# Patient Record
Sex: FEMALE
[De-identification: names, ages, dates, MRNs, and addresses within clinical notes are randomized per-mention and may not be internally consistent; named-entity substitution may affect disease eponyms.]

---

## 2020-01-14 ENCOUNTER — HOSPITAL ENCOUNTER (INPATIENT)
Dept: HOSPITAL 92 - ERS | Age: 65
LOS: 6 days | Discharge: NURSING, REHAB OR LONG-TERM CARE | DRG: 64 | End: 2020-01-20
Attending: FAMILY MEDICINE | Admitting: FAMILY MEDICINE
Payer: MEDICARE

## 2020-01-14 VITALS — BODY MASS INDEX: 46.8 KG/M2

## 2020-01-14 DIAGNOSIS — Z90.49: ICD-10-CM

## 2020-01-14 DIAGNOSIS — R40.2252: ICD-10-CM

## 2020-01-14 DIAGNOSIS — G93.6: ICD-10-CM

## 2020-01-14 DIAGNOSIS — E66.01: ICD-10-CM

## 2020-01-14 DIAGNOSIS — N17.9: ICD-10-CM

## 2020-01-14 DIAGNOSIS — R47.81: ICD-10-CM

## 2020-01-14 DIAGNOSIS — Z79.899: ICD-10-CM

## 2020-01-14 DIAGNOSIS — I48.91: ICD-10-CM

## 2020-01-14 DIAGNOSIS — I63.511: Primary | ICD-10-CM

## 2020-01-14 DIAGNOSIS — G81.94: ICD-10-CM

## 2020-01-14 DIAGNOSIS — R40.2142: ICD-10-CM

## 2020-01-14 DIAGNOSIS — I10: ICD-10-CM

## 2020-01-14 DIAGNOSIS — Z23: ICD-10-CM

## 2020-01-14 DIAGNOSIS — I34.1: ICD-10-CM

## 2020-01-14 DIAGNOSIS — Z66: ICD-10-CM

## 2020-01-14 DIAGNOSIS — R29.724: ICD-10-CM

## 2020-01-14 DIAGNOSIS — E78.5: ICD-10-CM

## 2020-01-14 DIAGNOSIS — R29.810: ICD-10-CM

## 2020-01-14 DIAGNOSIS — N30.00: ICD-10-CM

## 2020-01-14 DIAGNOSIS — R40.2362: ICD-10-CM

## 2020-01-14 DIAGNOSIS — B96.4: ICD-10-CM

## 2020-01-14 LAB
ALBUMIN SERPL BCG-MCNC: 4.5 G/DL (ref 3.4–4.8)
ALP SERPL-CCNC: 84 U/L (ref 40–110)
ALT SERPL W P-5'-P-CCNC: 28 U/L (ref 8–55)
ANION GAP SERPL CALC-SCNC: 16 MMOL/L (ref 10–20)
APTT PPP: 30.9 SEC (ref 22.9–36.1)
AST SERPL-CCNC: 24 U/L (ref 5–34)
BACTERIA UR QL AUTO: (no result) HPF
BASOPHILS # BLD AUTO: 0 THOU/UL (ref 0–0.2)
BASOPHILS NFR BLD AUTO: 0.1 % (ref 0–1)
BILIRUB SERPL-MCNC: 0.4 MG/DL (ref 0.2–1.2)
BUN SERPL-MCNC: 21 MG/DL (ref 9.8–20.1)
CALCIUM SERPL-MCNC: 9.6 MG/DL (ref 7.8–10.44)
CHLORIDE SERPL-SCNC: 103 MMOL/L (ref 98–107)
CO2 SERPL-SCNC: 24 MMOL/L (ref 23–31)
CREAT CL PREDICTED SERPL C-G-VRATE: 0 ML/MIN (ref 70–130)
DIGOXIN SERPL-MCNC: 0.7 NG/ML (ref 0.8–2)
EOSINOPHIL # BLD AUTO: 0 THOU/UL (ref 0–0.7)
EOSINOPHIL NFR BLD AUTO: 0.1 % (ref 0–10)
GLOBULIN SER CALC-MCNC: 2.9 G/DL (ref 2.4–3.5)
GLUCOSE SERPL-MCNC: 144 MG/DL (ref 80–115)
HGB BLD-MCNC: 14.1 G/DL (ref 12–16)
INR PPP: 1
LEUKOCYTE ESTERASE UR QL STRIP.AUTO: 500 LEU/UL
LYMPHOCYTES # BLD: 1.3 THOU/UL (ref 1.2–3.4)
LYMPHOCYTES NFR BLD AUTO: 11.8 % (ref 21–51)
MCH RBC QN AUTO: 29.3 PG (ref 27–31)
MCV RBC AUTO: 89.8 FL (ref 78–98)
MONOCYTES # BLD AUTO: 0.6 THOU/UL (ref 0.11–0.59)
MONOCYTES NFR BLD AUTO: 5.7 % (ref 0–10)
NEUTROPHILS # BLD AUTO: 8.8 THOU/UL (ref 1.4–6.5)
NEUTROPHILS NFR BLD AUTO: 82.3 % (ref 42–75)
PLATELET # BLD AUTO: 231 THOU/UL (ref 130–400)
POTASSIUM SERPL-SCNC: 3.8 MMOL/L (ref 3.5–5.1)
PROT UR STRIP.AUTO-MCNC: 50 MG/DL
PROTHROMBIN TIME: 13.4 SEC (ref 12–14.7)
RBC # BLD AUTO: 4.79 MILL/UL (ref 4.2–5.4)
RBC UR QL AUTO: (no result) HPF (ref 0–3)
SODIUM SERPL-SCNC: 139 MMOL/L (ref 136–145)
WBC # BLD AUTO: 10.7 THOU/UL (ref 4.8–10.8)
WBC UR QL AUTO: (no result) HPF (ref 0–3)

## 2020-01-14 PROCEDURE — 80162 ASSAY OF DIGOXIN TOTAL: CPT

## 2020-01-14 PROCEDURE — 90471 IMMUNIZATION ADMIN: CPT

## 2020-01-14 PROCEDURE — 74230 X-RAY XM SWLNG FUNCJ C+: CPT

## 2020-01-14 PROCEDURE — 81003 URINALYSIS AUTO W/O SCOPE: CPT

## 2020-01-14 PROCEDURE — 85014 HEMATOCRIT: CPT

## 2020-01-14 PROCEDURE — 85018 HEMOGLOBIN: CPT

## 2020-01-14 PROCEDURE — 93005 ELECTROCARDIOGRAM TRACING: CPT

## 2020-01-14 PROCEDURE — 36416 COLLJ CAPILLARY BLOOD SPEC: CPT

## 2020-01-14 PROCEDURE — 85049 AUTOMATED PLATELET COUNT: CPT

## 2020-01-14 PROCEDURE — 87077 CULTURE AEROBIC IDENTIFY: CPT

## 2020-01-14 PROCEDURE — 85025 COMPLETE CBC W/AUTO DIFF WBC: CPT

## 2020-01-14 PROCEDURE — 84484 ASSAY OF TROPONIN QUANT: CPT

## 2020-01-14 PROCEDURE — 80053 COMPREHEN METABOLIC PANEL: CPT

## 2020-01-14 PROCEDURE — 81015 MICROSCOPIC EXAM OF URINE: CPT

## 2020-01-14 PROCEDURE — 85610 PROTHROMBIN TIME: CPT

## 2020-01-14 PROCEDURE — 93306 TTE W/DOPPLER COMPLETE: CPT

## 2020-01-14 PROCEDURE — 80048 BASIC METABOLIC PNL TOTAL CA: CPT

## 2020-01-14 PROCEDURE — 80061 LIPID PANEL: CPT

## 2020-01-14 PROCEDURE — C9113 INJ PANTOPRAZOLE SODIUM, VIA: HCPCS

## 2020-01-14 PROCEDURE — 85730 THROMBOPLASTIN TIME PARTIAL: CPT

## 2020-01-14 PROCEDURE — 90670 PCV13 VACCINE IM: CPT

## 2020-01-14 PROCEDURE — 36415 COLL VENOUS BLD VENIPUNCTURE: CPT

## 2020-01-14 PROCEDURE — G0009 ADMIN PNEUMOCOCCAL VACCINE: HCPCS

## 2020-01-14 PROCEDURE — 87086 URINE CULTURE/COLONY COUNT: CPT

## 2020-01-14 PROCEDURE — 70450 CT HEAD/BRAIN W/O DYE: CPT

## 2020-01-14 PROCEDURE — 82565 ASSAY OF CREATININE: CPT

## 2020-01-14 PROCEDURE — 87186 SC STD MICRODIL/AGAR DIL: CPT

## 2020-01-14 NOTE — CT
Head CT without contrast

1/14/2020:



Comparison: None



HISTORY: Left facial droop



TECHNIQUE: Axial CT imaging at 5 mm intervals from vertex through skull base without contrast



FINDINGS: There is a large area of hypodensity with loss of gray-white differentiation involving the 
majority of the MCA territory on the right, evidence of right MCA infarction. This measures at

least 9.5 cm in AP dimension and 4.8 cm in transverse dimension, involving the posterior and inferior
 right frontal lobe and of the majority of the right temporal lobe. There is no associated

hemorrhage. There is mild mass effect with midline shift at the axial level of the septum pellucidum 
from right to left measuring in the 3 mm range.



IMPRESSION: Evidence of acute infarction involving the majority of the MCA territory on the right. No
 associated hemorrhage. Mild midline shift from right to left. Short-term follow-up CT is thus

advised.



Results called to Dr. Nair at 9:10 AM 1/14/2020. 



Reported By: Aayush Jerez 

Electronically Signed:  1/14/2020 9:14 AM

## 2020-01-14 NOTE — CON
DATE OF CONSULTATION:  01/14/2020



CONSULTING PHYSICIAN:  Hospitalist Service.



IMPRESSION:  

1. Right MCA stroke, likely secondary to a cardioembolic event.

2. Atrial fibrillation.

3. Obesity.

4. Hyperlipidemia.

5. Hypertension.

6. History of chronic vertigo.



PLAN:  

1. Repeat CT scan of the brain tomorrow.

2. Fluid restriction.

3. Keep head of bed elevated at least 30 degrees.

4. Start anticoagulation tomorrow if her CT is negative for any secondary hemorrhage.

5. Carotid Doppler.

6. Echocardiogram.

7. Speech therapy, physical therapy, and occupational therapy evaluations.



HISTORY OF PRESENT ILLNESS:  Ms. Judge is a 64-year-old woman, who awoke from sleep

to find that she had her left hemiparesis.  She was brought into the emergency room

for evaluation.  Her CT scan revealed an early area of ischemia involving the entire

right MCA territory.  She was noted to be in atrial fibrillation.  Her blood

pressure was under good control.  She was admitted to the stroke unit.  She has not

had any stroke symptoms in the past.  She is not complaining of any headache.  She

had some vertigo and nausea earlier that was brought on by movement.  Apparently,

this is a chronic issue with her.  She has a DNR in place as she does not want to be

on a ventilator. 



PAST MEDICAL HISTORY:  As listed above.



ALLERGIES:  BACTRIM.



SOCIAL HISTORY:  No tobacco or alcohol use.



FAMILY HISTORY:  Noncontributory.



MEDICATION LIST:  Reviewed.



REVIEW OF SYSTEMS:  Ten-system review of systems is otherwise negative.



PHYSICAL EXAMINATION:

GENERAL:  She is an obese, middle-aged woman, lying in bed, in no acute distress. 

VITAL SIGNS:  Blood pressure 116/78, pulse 100, respirations 14, temperature 98.2. 

HEENT:  Pupils are equal and reactive.  Conjunctivae clear.  Oropharynx clear.

Cranium, normocephalic and atraumatic. 

NECK:  Supple. 

EXTREMITIES:  No cyanosis. 

NEUROLOGIC:  She awakens easily, was conversant and follows commands appropriately.

Her speech is fairly clear.  She has a left facial droop.  There may be some partial

left visual field deficit that was somewhat inconsistently demonstrated.  She has a

dense hemiparesis on the left.  She has significant sensory deficit of the left side

as well.  No abnormal movements were seen.  Gait is not testable.  EKG shows atrial

fibrillation with a rapid ventricular response. 



LABORATORY DATA:  Laboratory studies were reviewed.



SUMMARY:  This is an unfortunate middle-aged woman with fairly massive stroke.

Hopefully, the cerebral edema will not get out of control to cause significant shift

and herniation.  I explained the gravity of the situation to her .  They have

an understanding and want to respect her wishes that she would not be placed on a

ventilator if her respiratory status declined.  I will follow up in her care. 







Job ID:  [id number]

## 2020-01-15 LAB
ANION GAP SERPL CALC-SCNC: 13 MMOL/L (ref 10–20)
BUN SERPL-MCNC: 18 MG/DL (ref 9.8–20.1)
CALCIUM SERPL-MCNC: 9.1 MG/DL (ref 7.8–10.44)
CHD RISK SERPL-RTO: 3.6 (ref ?–4.5)
CHLORIDE SERPL-SCNC: 105 MMOL/L (ref 98–107)
CHOLEST SERPL-MCNC: 133 MG/DL
CO2 SERPL-SCNC: 24 MMOL/L (ref 23–31)
CREAT CL PREDICTED SERPL C-G-VRATE: 138 ML/MIN (ref 70–130)
GLUCOSE SERPL-MCNC: 129 MG/DL (ref 80–115)
HDLC SERPL-MCNC: 37 MG/DL
HGB BLD-MCNC: 13.4 G/DL (ref 12–16)
LDLC SERPL CALC-MCNC: 82 MG/DL
MCH RBC QN AUTO: 29.2 PG (ref 27–31)
MCV RBC AUTO: 89.9 FL (ref 78–98)
MDIFF COMPLETE?: YES
PLATELET # BLD AUTO: 238 THOU/UL (ref 130–400)
POTASSIUM SERPL-SCNC: 4.1 MMOL/L (ref 3.5–5.1)
RBC # BLD AUTO: 4.58 MILL/UL (ref 4.2–5.4)
SODIUM SERPL-SCNC: 138 MMOL/L (ref 136–145)
TRIGL SERPL-MCNC: 71 MG/DL (ref ?–150)
WBC # BLD AUTO: 12.3 THOU/UL (ref 4.8–10.8)

## 2020-01-15 RX ADMIN — CEFTRIAXONE SCH MLS: 2 INJECTION, POWDER, FOR SOLUTION INTRAMUSCULAR; INTRAVENOUS at 14:55

## 2020-01-15 NOTE — CON
DATE OF CONSULTATION:  



HISTORY OF PRESENT ILLNESS:  The patient is an unfortunate 64-year-old woman, who

presents with acute onset of left-sided weakness.  The patient has a previous

history of mitral valve prolapse.  She has been followed previously at [facility], and seeing Dr. Abdullahi.  She reports having occasional palpitations.  She

presented to the emergency room with acute onset of left-sided weakness. 



PAST MEDICAL HISTORY:  Significant for;

1. Mitral valve prolapse.

2. Hypertension.

3. Obesity.



PAST SURGICAL HISTORY:  Had hernia surgery and cholecystectomy.



SOCIAL HISTORY:  Nonsmoker.



FAMILY HISTORY:  Positive family history of coronary artery disease.



ALLERGIES:  ALLERGIC TO BACTRIM AND TRIMETHOPRIM.



MEDICATIONS:  

1. Lipitor 10 at bedtime.

2. Metoprolol 100 b.i.d.

3. Losartan 100/25 daily.

4. Norvasc 10 daily.

5. Prilosec 10 daily.



REVIEW OF SYSTEMS:  Ten-point system otherwise unremarkable.



PHYSICAL EXAMINATION:

GENERAL:  Obese woman, in no acute distress. 

VITAL SIGNS:  Blood pressure 121/75. 

NECK:  No jugular venous distention. 

LUNGS:  Clear to auscultation. 

HEART:  Irregular rate and rhythm.  Normal S1, S2.  No murmurs. 

ABDOMEN:  Markedly distended. 

EXTREMITIES:  Showed no edema. 

VASCULAR:  Radial pulses are 2+. 

NEUROLOGIC:  The patient has minimal movement in her left toes, has 0/5 strength in

her left upper and lower extremities. 



LABORATORY RESULTS:  White blood cell count 10.7, hemoglobin 14.1, hematocrit 43.0,

platelets are 231.  Her sodium was 139, potassium 3.8, chloride 103, bicarbonate 24,

BUN 29, creatinine 0.9, glucose is 144.  EKG revealed atrial fibrillation with rapid

ventricular response, nonspecific ST abnormality. 



IMPRESSION:  

1. Large cerebrovascular accident.

2. New onset atrial fibrillation.

3. Hypertension. 

4. History of mitral valve prolapse.

5. Obesity.  This patient presents with cerebrovascular accident.  The patient is

being treated with aspirin.  She will need to be placed on long-term anticoagulation

therapy.  We will use digoxin and/or metoprolol to control heart rate.  We will

follow this patient with you through her hospitalization. 







Job ID:  [id number]

## 2020-01-15 NOTE — PDOC.EVN
Event Note





- Event Note


Event Note: 





Notified yesterday evening at approximately 10 pm that patients family were 

concerned for episode of 19 second pause that occurred when patient receiving 

digoxin dose 0.25 mg.  Per RN, HR was fluctuating between 90s to 120s on tele 

monitor. She did not receive the full dose, it was stopped immediately when she 

collapsed backwards onto the bed.  Patients HR has remained in 90s since then 

and BP has been stable. 





Dr. Valenzuela was notified.   remained concerned and though he was not in 

the room he states he feels the medication was given without monitoring of 

patients heart.  He was reassured that the patient was being monitored by RN 

when medication was being administered and that HR was labile.  





Following discussion with on-call cardiologist, we decided to obtain digoxin 

level and discontinue morning dose of digoxin. 





Further recommendations as per Dr. Valenzuela.   seems to be at ease with 

this plan.  


Continue to monitor. Patient resting comfortably when evaluated.

## 2020-01-15 NOTE — HP
PRIMARY CARE PHYSICIAN:  Dr. Alfred Dmoinguez at [facility] when she is in town.

When she is at home in [place], it is a PA named Ira. 



CHIEF COMPLAINT:  Left-sided weakness.



HISTORY OF PRESENT ILLNESS:  This is a 64-year-old white female with a past medical

history of hypertension, hyperlipidemia, and mitral valve prolapse, and then about a

month or two ago starting to have runs of palpitations diagnosed as atrial

fibrillation by her PA in [place], not on any aspirin or anticoagulants.  She had 2

or 3 runs of palpitations lasting for a significant period of time over the last 1

to 2 months, though none that she recognized the last day or two.  She was staying

at a hotel in [place] while doing some work at [employer], which she

intermittently does.  She was conversing with her  over the phone last night

during the football game, was not having any symptoms or difficulties, and around 11

o'clock last night she stopped responding to his texts.  He thought she had gone to

sleep, however, this morning she did not show up at work.  They went to her hotel

room and found her with left-sided weakness, left facial droop, and some confusion

and slurred speech.  The patient was taken to the emergency room.  There she had a

CT scan showing an acute ischemic stroke involving the majority of the right MCA

territory without any associated hemorrhage.  She was also found to be in atrial

fibrillation with rapid ventricular rate up into the 150s.  She was outside the

window for tPA and so no tPA was given.  She was within 24 hours, so Dr. Bailey was

consulted from the emergency room.  He looked at the CT scan and determined that she

was too high of a risk for bleeding for him to attempt an embolectomy.  The patient

was given 300 mg of aspirin rectally and also a single dose of digoxin IV, which has

helped to slow her rate down.  She otherwise has a mild headache and reports that

she forgot her Prilosec last night, so she is getting some reflux symptoms.  No

other complaints. 



REVIEW OF SYSTEMS:  CONSTITUTIONAL:  No fevers, no chills. 

EYES:  No double vision or blurred vision. 

ENT:  No congestion, drainage, or sore throat. 

CARDIOVASCULAR:  See HPI.  No chest pain.  No current palpitations that she can

feel. 

PULMONARY:  No coughing, wheezing, or shortness of breath. 

GASTROINTESTINAL:  She has some acid reflux in the back of her throat, but abdominal

pain.  No nausea or vomiting.  No diarrhea or constipation. 

GENITOURINARY:  No dysuria or hematuria. 

MUSCULOSKELETAL:  No muscle aches or joint pains. 

SKIN:  No rashes or lesions she has noted. 

NEUROLOGIC:  See HPI.



PAST MEDICAL HISTORY:  

1. Hypertension.

2. Hyperlipidemia.

3. Mitral valve prolapse.

4. Atrial fibrillation as per HPI.



PAST SURGICAL HISTORY:  No known surgical procedures.



SOCIAL HISTORY:  No tobacco, alcohol, or illicit drug use.  The patient is .

Her  is at the bedside. 



FAMILY HISTORY:  Mother, aunt, and maternal grandmother all had breast or uterine

cancer and dad  of an acute myocardial infarction at 65 years old. 



ALLERGIES:  NO KNOWN DRUG ALLERGIES.



CURRENT MEDICATIONS:  

1. Amlodipine 5 mg twice a day.

2. Atorvastatin 10 mg daily.

3. Losartan/hydrochlorothiazide 100/12.5 mg daily.

4. Metoprolol tartrate 100 mg daily.

5. Prilosec 40 mg daily.

6. Fluocinonide  0.5% topical cream used as needed.

7. Sleep-Jag 25 mg at night as needed.



PHYSICAL EXAMINATION:

VITAL SIGNS:  Blood pressure 100/60, pulse running in the 90s to low 100s right now

after the digoxin, respirations 16, temperature 97.9, O2 saturation 94% on room air. 

GENERAL:  This is a well-developed obese white female, in no acute distress. 

HEENT:  Pupils equal, round, and reactive to light.  Oropharynx clear without

lesions, erythema, or exudate. 

NECK:  Supple.  No lymphadenopathy.  No thyroid nodules or enlargement. 

HEART:  Irregularly irregular rhythm without murmurs, rubs, or gallops.  Some mild

tachycardia. 

LUNGS:  Clear to auscultation bilaterally.  No wheezes, crackles, or rhonchi. 

ABDOMEN:  Soft, obese, nontender to palpation.  Normoactive bowel sounds.  No

hepatosplenomegaly or other masses. 

EXTREMITIES:  No clubbing, cyanosis, or edema. 

SKIN:  No rashes or lesions. 

NEUROLOGIC:  The patient has a left facial droop.  She has complete paralysis of the

left upper extremity.  She does have some movement of the left lower extremity, is

able to lift her leg off the bed a few millimeters and able to wiggle her toes a

little bit after lots of encouragement.  She does have some significant left-sided

neglect and she has sensation loss on the entire left side of her body.  The patient

also has a left-sided deviation of her tongue, some mild slurred speech, though easy

to understand. 

PSYCHIATRIC:  The patient is a little sleepy but easily arousable.  She is oriented

to person.  She knows she is in the emergency room though she gets confused about if

she is in [place] or [place].  She knows it is .  She could not name the President

and she thought it was [place].  She was able to give some of the history, though I

had to get some of it from her  as well and she kept getting confused about

who thought she was in [place], who thought she was in [place] and where she actually

was, but otherwise gave me some history about her medical history. 



LABORATORY DATA:  CBC within normal limits.  Coagulation profile normal.  Complete

metabolic panel notable for a BUN of 21 and a glucose of 144.  Troponin negative x1.

 Urinalysis with 500 leukocyte esterase, 21 to 50 white blood cells, and 4+

bacteria.  CT of the brain, I did review the films along with the radiologist's

report.  It does show evidence of acute infarction involving the majority of the MCA

territory on the right without associated hemorrhage, mild midline shift from right

to left.  EKG shows atrial fibrillation in the 120s with rapid ventricular response

and no evidence of ST-segment elevation or depression. 



ASSESSMENT:  

1. Severe acute ischemic stroke of the right MCA distribution.  The patient is

beyond the tPA window and the stroke is too large to do an embolectomy, treating

with aspirin.  We will hold on other anticoagulants until we recheck a CT scan at 24

hours to make sure that there is no secondary bleeding.  I did speak with Dr. Allen about the patient and confirmed this plan with him. 

2. Atrial fibrillation with rapid ventricular response.  This is improved some with

digoxin.  I have consulted Dr. Valenzuela to assist with management of this.  Would

like to either restart some of her beta-blocker or start her on diltiazem, but her

blood pressure is not allowing it at this point.  We will also need to start her on

full anticoagulation if her next CT scan has no evidence of hemorrhage. 

3. Hypertension.  Holding antihypertensives for now as she is actually on the lower

side.  If she does start to feel hypertensive, we will allow some compensatory

hypertension.  We will give p.r.n. medications for any severe elevations of blood

pressure. 

4. Hyperlipidemia.  We will resume the patient's statin.  She actually probably can

be used to have her dose increased. 

5. Gastrointestinal prophylaxis and reflux.  We will resume the patient's PPI.  We

will give protein Protonix IV for now until she can take p.o. 

6. Slurred speech, question of dysphasia.  We will have the nurses do a bedside

swallow test on her. If she states she can start taking some sips of water with

medicines, then we will get Speech therapy in to evaluate her. 

7. Deep venous thrombosis prophylaxis.  The patient on sequential compression

devices while in bed.  Eventually, she will need to be fully anticoagulated. 



CODE STATUS:  I did discuss this with the patient with her  who is her

medical decision maker.  She had a hard time understanding the concepts that I was

asking about due to her mild confusion from the stroke.  However, in speaking with

her and her , they came to a decision that they did not want to have any

intubation, chest compressions or shocks done, that they want a full medical

treatment for the stroke, but should she decompensate, they did not want any extreme

measures like putting her on a ventilator.  So she is a do not attempt

resuscitation.  As such, we are going to admit her to the stroke unit instead of the

ICU. 







Job ID:  [id number]

## 2020-01-15 NOTE — PDOC.HOSPP
- Subjective


Encounter Date: 01/15/20


Encounter Time: 13:25


Subjective: 





f/u for large R MCA territorial embolic CVA with increased edema on CT imaging 

this am. Still has R hemiparesis and visual defects. Taking ice chips. 





- Objective


Vital Signs & Weight: 


 Vital Signs (12 hours)











  Temp Pulse Resp BP Pulse Ox


 


 01/15/20 11:30  98.4 F  98  14  118/61  94 L


 


 01/15/20 10:00      93 L


 


 01/15/20 07:55      93 L


 


 01/15/20 07:48  98.1 F  99  15  117/58 L  93 L


 


 01/15/20 04:00  99.5 F  102 H  16  100/56 L  95








 Weight











Weight                         281 lb 7 oz














Result Diagrams: 


 01/15/20 04:32





 01/15/20 04:32


Additional Labs: 





Microbiology





01/14/20 16:54   Urine clean catch   Urine Culture - Preliminary


                              Gram Negative [place]





 Laboratory Tests











  01/14/20 01/14/20 01/14/20





  09:02 09:02 22:25


 


WBC   10.7 


 


Neutrophils %   82.3 H 


 


Neutrophils % (Manual)   


 


BUN  21 H  


 


Creatinine  0.90  


 


Triglycerides   


 


Cholesterol   


 


LDL Cholesterol, Calc   


 


HDL Cholesterol   


 


Digoxin    0.70 L














  01/15/20 01/15/20





  04:32 04:32


 


WBC  


 


Neutrophils %  


 


Neutrophils % (Manual)   80 H


 


BUN  


 


Creatinine  


 


Triglycerides  71 


 


Cholesterol  133 


 


LDL Cholesterol, Calc  82 


 


HDL Cholesterol  37 


 


Digoxin  











Radiology Reviewed by me: Yes (CT brain - increased edema with R to L 7mm shift)


EKG Reviewed by me: Yes (Tele - A-fib in low 100's)





Hospitalist ROS





- Medication


Medications: 


Active Medications











Generic Name Dose Route Start Last Admin





  Trade Name Tana  PRN Reason Stop Dose Admin


 


Aspirin  300 mg  01/15/20 09:00  01/15/20 09:57





  Aspirin  NJ   300 mg





  DAILY ABENA   Administration





     





     





     





     


 


Pantoprazole Sodium  40 mg  01/15/20 09:00  01/15/20 09:55





  Protonix  IVP   40 mg





  DAILY ABENA   Administration





     





     





     





     














- Exam


General Appearance: NAD, awake alert


Eye: PERRL, anicteric sclera


ENT: normocephalic atraumatic, no oropharyngeal lesions


Neck: supple, symmetric, no JVD, no thyromegaly


Heart: no gallops, no rubs, normal peripheral pulses, irregular


Respiratory: CTAB, no wheezes, no rales, no ronchi, normal chest expansion


Gastrointestinal: soft, non-tender, non-distended, normal bowel sounds, no 

palpable masses


Extremities: no cyanosis, no clubbing, no edema


Skin: normal turgor, no lesions


Neurological - other findings: R hemiparesis, visual field defect, dysphagia


Musculoskeletal: normal tone, generalized weakness


Psychiatric: A&O x 3, flat affect





Hosp A/P


(1) Acute ischemic right MCA stroke


Code(s): I63.511 - CEREB INFRC D/T UNSP OCCLS OR STENOS OF RIGHT MID CEREB ART 

  Status: Acute   


Plan: 


Likely embolic, start Lovenox 120mg sc q12h, + increasing cerebral edema 

associated with CVA, start Dexamethasone IV today








(2) Cerebral edema


Code(s): G93.6 - CEREBRAL EDEMA   Status: Acute   


Plan: 


Secondary to #1, start Dexamethasone, consider NS evaluation, serial CT brain 

imaging








(3) UTI (urinary tract infection)


Status: Acute   


Plan: 


GNR, start Rocephin 2gm IV daily








(4) Atrial fibrillation with RVR


Code(s): I48.91 - UNSPECIFIED ATRIAL FIBRILLATION   Status: Acute   


Plan: 


Rate variable, start Cardizem 5mg IV infusion, start Lovenox








(5) Hypertension


Code(s): I10 - ESSENTIAL (PRIMARY) HYPERTENSION   Status: Chronic   


Qualifiers: 


   Hypertension type: essential hypertension   Qualified Code(s): I10 - 

Essential (primary) hypertension   


Plan: 


Resume home BP regimen when tolerating po intake








- Plan


plan discussed w/ family, continue antibiotics, PT/OT, , speech 

therapy, DVT proph w/SCDs





Start Dexamethasone 10mg IV now then 4mg IV q6h


Start Rocephin 2gm IV daily


Start Lovenox 120mg sc BID


SLP daily evaluation


PT/OT for mobilization


Rehab options pending


CT Brain in am

## 2020-01-15 NOTE — PDOC.PALCO
Palliative Care Consult





- Consult Details


Requesting Physician: Dr Moe


Reason for Consult: family support


Family Members Present: , patient two daughters





- Pertinent HPI





Mrs Judge is a 64 year old female who [place] [employer] [place], last 

texted her  the night of 11/13/2020, when she did not arrive for work 

the morning of 1/14 they called the hotel and EMS was called secondary to 

pronounced left sided weakness, confusion and slurred speech. She as taken to 

the emergency room where an acute ischemic stroke was noted inclusive of the 

majority of the right mCA territory without hemorrhage, also noted to be in 

afib. The patient expressed she does not want any resuscitative measures, 

therefore she was placed on tele.  Overnight patient had cardiac episode, and 

follow up CT identified increase in cytotoxic edema secondary to the acute 

right MCA infarction, measuring 7cm from right to left. No tPA and too high 

risk for embolectomy related to bleeding.





- Pertinent PMH





HTN, HDL, Morbid obesity, Mitral valve prolapse, Atrial Fib





- Social History


Smoking Status: Never smoker


Smoking: no tobacco exposure


Alcohol Use: none


Drug Use History: none


Living Situation: 





- Medications


MAR Reviewed: Yes





- Allergies


Allergies/Adverse Reactions: 


 Allergies











Allergy/AdvReac Type Severity Reaction Status Date / Time


 


sulfamethoxazole Allergy   Verified 01/14/20 16:07





[From Bactrim]     


 


trimethoprim [From Bactrim] Allergy   Verified 01/14/20 16:07














- Subjective





Resting, hob elevated 30 degrees. Does not open eyes by choice, but responds 

verbally.  and two daughters at bedside. 





- ROS


Constitutional: alert, weakness


Eyes: other (denies visual changes)


ENT: difficulty swallowing, other


Respiratory: other (negative for cough, difficulity breathing)


Gastrointestinal: other (deneis nausea, vomiting)


Musculoskeletal: other (negative for pain to extremities)


Neurological: change in speech, headache, other (left sided weakness)





- Objective


Vital Signs: 


 Vital Signs - Most Recent











Temp Pulse Resp BP Pulse Ox


 


 98.1 F   99   15   117/58 L  93 L


 


 01/15/20 07:48  01/15/20 07:48  01/15/20 07:48  01/15/20 07:48  01/15/20 10:00











Palliative Performance Scale: 40 (Bed bound, only secondary to current 

suggestion for CVA)





- Physical Exam


Constitutional: ill appearing, mild distress


HEENT: moist MMs, sclera anicteric


Respiratory: no wheezing, unlabored breathing, diminished lung sound


Cardiovascular: irregular


Gastrointestinal: soft, non-tender, positive bowel sounds


Musculoskeletal: no cyanosis, no clubbing


Deviation from normal: left sided weakness, no deficit to right side


Skin: cap refill <2 seconds, no lesions, no rash


Psychiatric: A&O x 3





- Problem List


(1) Palliative care encounter


Code(s): Z51.5 - ENCOUNTER FOR PALLIATIVE CARE   Current Visit: Yes   Status: 

Acute   





(2) Acute ischemic right MCA stroke


Code(s): I63.511 - CEREB INFRC D/T UNSP OCCLS OR STENOS OF RIGHT MID CEREB ART 

  Current Visit: Yes   Status: Acute   





(3) Atrial fibrillation with RVR


Code(s): I48.91 - UNSPECIFIED ATRIAL FIBRILLATION   Current Visit: Yes   Status

: Acute   





(4) Hypertension


Code(s): I10 - ESSENTIAL (PRIMARY) HYPERTENSION   Current Visit: Yes   Status: 

Acute   





(5) Elevated HDL


Code(s): E78.89 - OTHER LIPOPROTEIN METABOLISM DISORDERS   Current Visit: Yes   

Status: Acute   





(6) Morbid obesity


Code(s): E66.01 - MORBID (SEVERE) OBESITY DUE TO EXCESS CALORIES   Current Visit

: Yes   Status: Acute   





- Plan/Recommendations


Plan:


Visited with family and patient. Patient verbal with soft speech, and "thumbs up

" to communicate. They appear comfortable with care at this time and are 

hopeful for improvement today. Confirmed wishes of no resuscitation measures, 

CT identified increase in cerebral edema. Therapeutic listening and emotional 

support provided. Encouraged to minimize stimuli to promote optimal 

environment.Speech therapy also present.  Spiritual care consult placed for 

additional support.





Please see Palliative Care RN notes in note section for further information. 




















[50] minutes spent on this encounter with >50% of the time in counseling and 

coordination of care.





Thank you for this very appropriate consult.

## 2020-01-15 NOTE — CT
Head CT without contrast

1/15/2020:



COMPARISON: 1/14/2020



HISTORY: Acute stroke, assess for intracranial hemorrhage



TECHNIQUE: Axial CT imaging at 5 mm intervals from vertex through skull base without contrast



FINDINGS: There has been interval increase in the conspicuity of the cytotoxic edema within the MCA t
erritory on the right consistent with extensive right MCA infarction. No associated intracranial

hemorrhage. The associated mass effect has worsened when compared to the prior exam. At the axial lev
el of the septum pellucidum there is 7 mm of right to left midline shift, worsened from 3-4

millimeters on the prior exam. There is also worsening effacement of the right lateral ventricle with
 mild prominence of the left lateral ventricle.



Imaged paranasal sinuses and mastoid air cells are well-aerated. No displaced calvarial fracture.



IMPRESSION: Worsening cytotoxic edema associated with acute right MCA infarction. There is worsening 
mass effect with midline shift now measuring 7 mm from right to left.



Reported By: Aayush Jerez 

Electronically Signed:  1/15/2020 9:07 AM

## 2020-01-16 RX ADMIN — CEFTRIAXONE SCH MLS: 2 INJECTION, POWDER, FOR SOLUTION INTRAMUSCULAR; INTRAVENOUS at 14:33

## 2020-01-16 NOTE — PDOC.HOSPP
- Subjective


Encounter Date: 01/16/20


Encounter Time: 18:25


Subjective: 





f/u for large R MCA territorial embolic CVA in context of A-fib. No new events 

reported but remains NPO per SLP recommendations until re-evaluation in next 

24h. Receiving Lovenox/ASA/Lipitor. Remains on Cardizem gtt with rate-

controlled A-fib





- Objective


Vital Signs & Weight: 


 Vital Signs (12 hours)











  Temp Pulse Pulse Pulse Resp BP BP


 


 01/16/20 15:39  98.5 F  96    12  


 


 01/16/20 13:24    95  109 H   131/77  155/81 H


 


 01/16/20 11:46  98.1 F  98    10 L  


 


 01/16/20 07:42  97.6 F  80    18  


 


 01/16/20 07:34       














  BP Pulse Ox


 


 01/16/20 15:39  125/75  95


 


 01/16/20 13:24  


 


 01/16/20 11:46  155/87 H  94 L


 


 01/16/20 07:42  123/75  96


 


 01/16/20 07:34   94 L








 Weight











Admit Weight                   281 lb


 


Weight                         281 lb 7 oz














I&O: 


 











 01/15/20 01/16/20 01/17/20





 06:59 06:59 06:59


 


Intake Total  595 


 


Output Total  1325 


 


Balance  -730 











Result Diagrams: 


 01/15/20 04:32





 01/15/20 04:32


Additional Labs: 





Microbiology





01/14/20 16:54   Urine clean catch   Urine Culture - Final


                              Proteus mirabilis


01/14/20 16:54   Urine clean catch   Urine Culture - Preliminary


                              Gram Negative Jordan





 Laboratory Tests











  01/14/20 01/14/20 01/14/20





  09:02 09:02 22:25


 


WBC   10.7 


 


Neutrophils %   82.3 H 


 


Neutrophils % (Manual)   


 


BUN  21 H  


 


Creatinine  0.90  


 


Triglycerides   


 


Cholesterol   


 


LDL Cholesterol, Calc   


 


HDL Cholesterol   


 


Digoxin    0.70 L














  01/15/20 01/15/20





  04:32 04:32


 


WBC  


 


Neutrophils %  


 


Neutrophils % (Manual)   80 H


 


BUN  


 


Creatinine  


 


Triglycerides  71 


 


Cholesterol  133 


 


LDL Cholesterol, Calc  82 


 


HDL Cholesterol  37 


 


Digoxin  











Radiology Reviewed by me: Yes (CT brain - R MCA territorial edema, no herniation

)


EKG Reviewed by me: Yes (Tele - A-fib in 80's)





Hospitalist ROS





- Medication


Medications: 


Active Medications











Generic Name Dose Route Start Last Admin





  Trade Name Freq  PRN Reason Stop Dose Admin


 


Aspirin  300 mg  01/15/20 09:00  01/16/20 09:58





  Aspirin  MS   300 mg





  DAILY [facility]   Administration





     





     





     





     


 


Atorvastatin Calcium  10 mg  01/15/20 21:00  01/15/20 21:18





  Lipitor  PO   Not Given





  HS [facility]   





     





     





     





     


 


Enoxaparin Sodium  120 mg  01/15/20 21:00  01/16/20 10:02





  Lovenox  SC   120 mg





  0900,2100 [facility]   Administration





     





     





     





     


 


Ceftriaxone Sodium 2 gm/  100 mls @ 200 mls/hr  01/15/20 14:00  01/16/20 14:33





  Sodium Chloride  IVPB   100 mls





  1400 [facility]   Administration





     





     





     





     


 


Dexamethasone 4 mg/ Sodium  51 mls @ 100 mls/hr  01/15/20 20:00  01/16/20 13:54





  Chloride  IVPB   51 mls





  0200,0800,1400,2000 [facility]   Administration





     





     





     





     


 


Diltiazem HCl 125 mg/ Sodium  125 mls @ 5 mls/hr  01/15/20 14:00  01/16/20 15:54





  Chloride  IVPB   125 mls





  INF [facility]   Administration





     





     





  Protocol   





  5 MG/HR   


 


Pantoprazole Sodium  40 mg  01/15/20 09:00  01/16/20 10:02





  Protonix  IVP   40 mg





  DAILY [facility]   Administration





     





     





     





     














- Exam


General Appearance: NAD, awake alert


Eye: PERRL, anicteric sclera


ENT: normocephalic atraumatic, no oropharyngeal lesions


Neck: supple, symmetric, no JVD, no thyromegaly, no lymphadenopathy


Heart: no gallops, no rubs, normal peripheral pulses, irregular


Respiratory: CTAB, no wheezes, no rales, no ronchi, normal chest expansion


Gastrointestinal: soft, non-tender, non-distended, normal bowel sounds


Extremities: no cyanosis, no clubbing, no edema


Skin: normal turgor, no lesions


Neurological: no new deficit


Neurological - other findings: L hemiparesis, dysphagia


Musculoskeletal: generalized weakness


Psychiatric: A&O x 3





Hosp A/P


(1) Acute ischemic right MCA stroke


Code(s): I63.511 - CEREB INFRC D/T UNSP OCCLS OR STENOS OF RIGHT MID CEREB ART 

  Status: Acute   


Plan: 


Continue Lovenox, ASA, general stroke protocol, continue Dexamethasone for 

cerebral edema








(2) Cerebral edema


Code(s): G93.6 - CEREBRAL EDEMA   Status: Acute   


Plan: 


See #1 above








(3) UTI (urinary tract infection)


Status: Acute   


Plan: 


Proteus spp on cx, continue Rocephin








(4) Atrial fibrillation with RVR


Code(s): I48.91 - UNSPECIFIED ATRIAL FIBRILLATION   Status: Acute   


Plan: 


Rate improved with Cardizem gtt, continue another 24h then initiate po rate-

control








(5) Hypertension


Code(s): I10 - ESSENTIAL (PRIMARY) HYPERTENSION   Status: Chronic   


Qualifiers: 


   Hypertension type: essential hypertension   Qualified Code(s): I10 - 

Essential (primary) hypertension   





- Plan


plan discussed w/ family, continue antibiotics, PT/OT, , speech 

therapy, DVT proph w/SCDs





Continue Dexamethasone 4mg IV q6h


Continue Rocephin 2gm IV daily


Lovenox 120mg sc BID


Continue Cardizem gtt


SLP daily evaluation


PT/OT for mobilization


Rehab options pending

## 2020-01-16 NOTE — CT
EXAM: CT brain without contrast



HISTORY: Right MCA stroke with midline shift



COMPARISON: 1/15/2020



TECHNIQUE: Multiple contiguous axial images were obtained and a CT of the brain without contrast.



FINDINGS: There is an evolving infarction in the right MCA distribution. There is stable mild shift o
f midline to the left. No downward herniation is seen. There is no evidence of hydrocephalus,

intracranial hemorrhage, or extra-axial fluid collection.



The calvarium and overlying soft tissues are unremarkable. The visualized paranasal sinuses and masto
id air cells are well aerated.



IMPRESSION: Evolving right MCA distribution infarction



Reported By: José Miguel Zuniga 

Electronically Signed:  1/16/2020 9:16 AM

## 2020-01-17 RX ADMIN — CEFTRIAXONE SCH MLS: 2 INJECTION, POWDER, FOR SOLUTION INTRAMUSCULAR; INTRAVENOUS at 15:03

## 2020-01-17 NOTE — RAD
MODIFIED BARIUM SWALLOW:



HISTORY: 

Dysphagia following cerebral infarct.



EXPOSURE:

1.7 minutes, 82.6 mGy*^m2.



FINDINGS: 

In the presence of speech pathologist, the patient was administered thin liquid, thick liquid, nectar
 thick, pudding and solid consistencies.



There is premature spillage into the piriform sinuses and vallecula. Minimal penetration is suggested
 with the thin liquid consistencies, when using a straw. There is also evidence of spillage from

the oral cavity, along the chin.



Barium tablet did pass with administration of puree thickened consistency.



IMPRESSION: 

Please of a speech pathologist report for feeding recommendation.



Transcribed Date/Time: 1/17/2020 11:49 AM



Reported By: Kimberley Mosley 

Electronically Signed:  1/17/2020 2:17 PM

## 2020-01-17 NOTE — PDOC.PALPN
Palliative Progress Note





- Subjective





Family at bedside (Daughter and ), patient verbal, delayed. Complains of 

dry eyes, mildly dry mouth. 





- Objective


Vital Signs: 


 Vital Signs - Most Recent











Temp Pulse Resp BP Pulse Ox


 


 98.0 F   82   22 H  124/80   96 


 


 01/17/20 07:37  01/17/20 07:37  01/17/20 07:37  01/17/20 07:37  01/17/20 07:37














- Physical Exam


Constitutional: NAD


HEENT: sclera anicteric


Respiratory: clear to auscultation bilateral, unlabored breathing, diminished 

lung sound


Cardiovascular: irregular


Gastrointestinal: soft, non-tender, positive bowel sounds


Genitourinary: hough catheter


Musculoskeletal: no cyanosis, no clubbing


Deviation from normal: left hemiplegia, dysphagia


Skin: cap refill <2 seconds, no lesions


Psychiatric: A&O x 3





- Assessment


(1) Palliative care encounter


Code(s): Z51.5 - ENCOUNTER FOR PALLIATIVE CARE   Current Visit: Yes   Status: 

Acute   





(2) Acute ischemic right MCA stroke


Code(s): I63.511 - CEREB INFRC D/T UNSP OCCLS OR STENOS OF RIGHT MID CEREB ART 

  Current Visit: Yes   Status: Acute   





(3) Atrial fibrillation with RVR


Code(s): I48.91 - UNSPECIFIED ATRIAL FIBRILLATION   Current Visit: Yes   Status

: Acute   





(4) Hypertension


Code(s): I10 - ESSENTIAL (PRIMARY) HYPERTENSION   Current Visit: Yes   Status: 

Chronic   


Qualifiers: 


   Hypertension type: essential hypertension   Qualified Code(s): I10 - 

Essential (primary) hypertension   





(5) Elevated HDL


Code(s): E78.89 - OTHER LIPOPROTEIN METABOLISM DISORDERS   Current Visit: Yes   

Status: Deleted   





(6) Morbid obesity


Code(s): E66.01 - MORBID (SEVERE) OBESITY DUE TO EXCESS CALORIES   Current Visit

: Yes   Status: Acute   





- Plan


Plan:


Ordered artificial tears and biotene. Patients favorite things are her dogs and 

playing games on her phone. She has two small dogs, her  states that if 

she could see her pets that would be the best therapy. Communicated with NIRANJAN Johnson RN Palliative to attempt to coordinate pet therapy. Patient to have 

swallow study today, family and patient hopeful for rehab and meaningful 

recovery. Strong family support.  




















[40] minutes spent on this encounter with >50% of the time in counseling and 

coordination of care.














- ROS


Constitutional: weakness


Eyes: other (dry sclera)


ENT: dry mouth, difficulty swallowing


Respiratory: other (negative for shortness of breath, cough)


Cardiology: other (negative for chest pain, palpitations)


Neurological: change in speech

## 2020-01-17 NOTE — PDOC.HOSPP
- Subjective


Encounter Date: 01/17/20


Encounter Time: 09:20


Subjective: 





pt up in bed no complains, family at bedside. she still has her Cardizem drip. 





- Objective


Vital Signs & Weight: 


 Vital Signs (12 hours)











  Temp Pulse Pulse Pulse Resp BP BP


 


 01/17/20 13:40    94  83   152/82 H  139/79


 


 01/17/20 12:40  97.9 F  89    20  


 


 01/17/20 09:49    107 H  75   123/93 H  112/64


 


 01/17/20 08:57       


 


 01/17/20 07:37  98.0 F  82    22 H  


 


 01/17/20 04:00  98.7 F  82    18  














  BP Pulse Ox


 


 01/17/20 13:40  


 


 01/17/20 12:40  127/81  96


 


 01/17/20 09:49  


 


 01/17/20 08:57   96


 


 01/17/20 07:37  124/80  96


 


 01/17/20 04:00  117/62  92 L








 Weight











Admit Weight                   281 lb


 


Weight                         281 lb 7 oz














I&O: 


 











 01/16/20 01/17/20 01/18/20





 06:59 06:59 06:59


 


Intake Total 595  


 


Output Total 1325  


 


Balance -730  











Result Diagrams: 


 01/15/20 04:32





 01/15/20 04:32





Hospitalist ROS





- Review of Systems


Cardiovascular: denies: chest pain, palpitations, orthopnea, paroxysmal noc. 

dyspnea, edema, light headedness, other


Gastrointestinal: denies: nausea, vomiting, abdominal pain, diarrhea, 

constipation, melena, hematochezia, other


Genitourinary: denies: dysuria, frequency, incontinence, hematuria, retention, 

other





- Medication


Medications: 


Active Medications











Generic Name Dose Route Start Last Admin





  Trade Name Freq  PRN Reason Stop Dose Admin


 


Aspirin  300 mg  01/15/20 09:00  01/17/20 08:57





  Aspirin  MO   300 mg





  DAILY [facility]   Administration





     





     





     





     


 


Atorvastatin Calcium  10 mg  01/15/20 21:00  01/16/20 20:46





  Lipitor  PO   Not Given





  HS [facility]   





     





     





     





     


 


Enoxaparin Sodium  120 mg  01/15/20 21:00  01/17/20 08:57





  Lovenox  SC   120 mg





  0900,2100 [facility]   Administration





     





     





     





     


 


Ceftriaxone Sodium 2 gm/  100 mls @ 200 mls/hr  01/15/20 14:00  01/16/20 14:33





  Sodium Chloride  IVPB   100 mls





  1400 [facility]   Administration





     





     





     





     


 


Dexamethasone 4 mg/ Sodium  51 mls @ 100 mls/hr  01/15/20 20:00  01/17/20 14:02





  Chloride  IVPB   51 mls





  0200,0800,1400,2000 [facility]   Administration





     





     





     





     


 


Diltiazem HCl 125 mg/ Sodium  125 mls @ 5 mls/hr  01/15/20 14:00  01/16/20 15:54





  Chloride  IVPB   125 mls





  INF [facility]   Administration





     





     





  Protocol   





  5 MG/HR   


 


Pantoprazole Sodium  40 mg  01/17/20 09:00  01/17/20 09:09





  Protonix  PO   40 mg





  DAILY [facility]   Administration





     





     





     





     














- Exam


Neck: negative: supple, symmetric, no JVD, no thyromegaly, no lymphadenopathy, 

no carotid bruit, JVD


Heart: negative: RRR, no murmur, no gallops, no rubs, normal peripheral pulses, 

irregular, diminshed peripheral pulses, murmur present, II/IV, III/IV


Respiratory: negative: CTAB, no wheezes, no rales, no ronchi, normal chest 

expansion, no tachypnea, normal percussion, rales, rhonchi, tachypneic, wheezes


Gastrointestinal: negative: soft, non-tender, non-distended, normal bowel sounds

, no palpable masses, no hepatomegaly, no splenomegaly, no bruit, no guarding, 

no rigidity, tender to palpation, distended, diminished bowl sounds, voluntary 

guarding


Extremities - other findings: left side weakness





Hosp A/P


(1) Acute ischemic right MCA stroke


Code(s): I63.511 - CEREB INFRC D/T UNSP OCCLS OR STENOS OF RIGHT MID CEREB ART 

  Status: Acute   





(2) Atrial fibrillation with RVR


Code(s): I48.91 - UNSPECIFIED ATRIAL FIBRILLATION   Status: Acute   





(3) Morbid obesity


Code(s): E66.01 - MORBID (SEVERE) OBESITY DUE TO EXCESS CALORIES   Status: 

Acute   





(4) UTI (urinary tract infection)


Status: Acute   





(5) Hypertension


Code(s): I10 - ESSENTIAL (PRIMARY) HYPERTENSION   Status: Chronic   


Qualifiers: 


   Hypertension type: essential hypertension   Qualified Code(s): I10 - 

Essential (primary) hypertension   





(6) UTI (urinary tract infection)


Status: Acute   





- Plan





rate controlled on cardizam. continue AC. pt going for swallow eval. will 

continue abx for her uti. possible will try to remove hough.

## 2020-01-18 PROCEDURE — 3E0234Z INTRODUCTION OF SERUM, TOXOID AND VACCINE INTO MUSCLE, PERCUTANEOUS APPROACH: ICD-10-PCS | Performed by: FAMILY MEDICINE

## 2020-01-18 RX ADMIN — CEFTRIAXONE SCH MLS: 2 INJECTION, POWDER, FOR SOLUTION INTRAMUSCULAR; INTRAVENOUS at 14:27

## 2020-01-18 RX ADMIN — Medication PRN ML: at 08:51

## 2020-01-18 NOTE — PDOC.CPN
- Subjective


Date: 01/18/20


Time: 11:15


Interval history: 





The pt seen and examined.  No overnight events.  No cardiac complaints. 





- Objective


Allergies/Adverse Reactions: 


 Allergies











Allergy/AdvReac Type Severity Reaction Status Date / Time


 


sulfamethoxazole Allergy   Verified 01/14/20 16:07





[From Bactrim]     


 


trimethoprim [From Bactrim] Allergy   Verified 01/14/20 16:07











Visit Medications: 


 Current Medications





Acetaminophen (Tylenol)  650 mg PO Q4H PRN


   PRN Reason: Headache/Fever/Mild Pain (1-3)


Acetaminophen (Tylenol)  650 mg UT Q4H PRN


   PRN Reason: Headache/Fever/Mild Pain (1-3)


Artificial Tears (Liquitears 15ml Bottle)  2 drop EA EYE QIDPRN PRN


   PRN Reason: Dry Eyes


   Last Admin: 01/17/20 17:34 Dose:  2 drop


Aspirin (Aspirin)  300 mg UT DAILY [facility]


   Last Admin: 01/18/20 08:44 Dose:  300 mg


Atorvastatin Calcium (Lipitor)  10 mg PO HS [facility]


   Last Admin: 01/17/20 21:05 Dose:  10 mg


Enoxaparin Sodium (Lovenox)  120 mg SC 0900,2100 [facility]


   Last Admin: 01/18/20 08:51 Dose:  120 mg


Guaifenesin/Dextromethorphan (Robitussin Dm)  15 ml PO Q4H PRN


   PRN Reason: Cough


Hydralazine HCl (Apresoline)  10 mg SLOW IVP Q4H PRN


   PRN Reason: BP > 220/110


Ceftriaxone Sodium 2 gm/ (Sodium Chloride)  100 mls @ 200 mls/hr IVPB 1400 [facility]


   Last Admin: 01/17/20 15:03 Dose:  100 mls


Dexamethasone 4 mg/ Sodium (Chloride)  51 mls @ 100 mls/hr IVPB 0200,0800,1400,

2000 [facility]


   Last Admin: 01/18/20 08:50 Dose:  51 mls


Diltiazem HCl 125 mg/ Sodium (Chloride)  125 mls @ 5 mls/hr IVPB INF [facility]; 

Protocol


   Last Admin: 01/17/20 17:34 Dose:  125 mls


Labetalol HCl (Normodyne)  20 mg SLOW IVP Q1H PRN


   PRN Reason: BP > 220/110


Miscellaneous Medication (Biotene Moisturizing Mouth)  0 ml MM PRN PRN


   PRN Reason: Dry Mouth


   Last Admin: 01/17/20 17:36 Dose:  1 spr


Ondansetron HCl (Zofran Odt)  4 mg PO Q6H PRN


   PRN Reason: Nausea/Vomiting


Ondansetron HCl (Zofran)  4 mg IVP Q6H PRN


   PRN Reason: Nausea/Vomiting


Pantoprazole Sodium (Protonix)  40 mg PO DAILY [facility]


   Last Admin: 01/18/20 08:50 Dose:  40 mg


Senna/Docusate Sodium (Senokot S)  2 tab PO BID PRN


   PRN Reason: Constipation


Sodium Chloride (Flush - Normal Saline)  10 ml IVF PRN PRN


   PRN Reason: Saline Flush


   Last Admin: 01/18/20 08:51 Dose:  10 ml








Vital Signs & Weight: 


 Vital Signs











  Temp Pulse Pulse Pulse Resp BP BP


 


 01/18/20 09:43    87  85   116/72  133/78


 


 01/18/20 08:44       


 


 01/18/20 08:00  97.5 F L  85    20  


 


 01/18/20 03:56  97.8 F  86    18  


 


 01/17/20 23:50  98 F  68    20  














  BP Pulse Ox


 


 01/18/20 09:43  


 


 01/18/20 08:44   99


 


 01/18/20 08:00  110/72  99


 


 01/18/20 03:56  98/65  96


 


 01/17/20 23:50  131/71  95








 











Admit Weight                   281 lb


 


Weight                         281 lb 7 oz

















- Physical Exam


General: alert & oriented x3


Neck: supple neck


Cardiac: irregularly regular


Lungs: decreased breath sounds


Neuro: other (numbness and unable to feel to LLE)


Extremities: no edema





- Labs


Result Diagrams: 


 01/15/20 04:32





 01/15/20 04:32


 Troponin/CKMB











Troponin I  Less than  0.010 ng/mL (< 0.028)   01/14/20  09:02    














- Telemetry


Supraventricular conduction: atrial fibrillation





- Assessment/Plan


Assessment/Plan: 





1. Afib with RVR - well controlled HR with Diltiazem 5mg/h; on Lovenox 120mg 

BID which will be changed to PO OAC


2. Acute ischemic right MCA stroke with Lt side weakness


3. HTN - stable


4. UTI 


5. Morbid obesity


MAR reviewed





* Echo on 01/15/2020 with EF 50-55%, mod dilated LA, mod-severe TR, and dilated 

IVC


* Miguelito Valenzuela' pt

## 2020-01-18 NOTE — PDOC.HOSPP
- Subjective


Encounter Date: 01/18/20


Encounter Time: 09:00


Subjective: 





pt up in bed no complains





- Objective


Vital Signs & Weight: 


 Vital Signs (12 hours)











  Temp Pulse Pulse Pulse Resp BP BP


 


 01/18/20 11:46  97.9 F  95    15  


 


 01/18/20 09:43    87  85   116/72  133/78


 


 01/18/20 08:44       


 


 01/18/20 08:00  97.5 F L  85    20  


 


 01/18/20 03:56  97.8 F  86    18  














  BP Pulse Ox


 


 01/18/20 11:46  127/79  96


 


 01/18/20 09:43  


 


 01/18/20 08:44   99


 


 01/18/20 08:00  110/72  99


 


 01/18/20 03:56  98/65  96








 Weight











Admit Weight                   281 lb


 


Weight                         281 lb 7 oz














I&O: 


 











 01/17/20 01/18/20 01/19/20





 06:59 06:59 06:59


 


Intake Total  500 


 


Output Total  750 300


 


Balance  -250 -300











Result Diagrams: 


 01/15/20 04:32





 01/15/20 04:32





Hospitalist ROS





- Review of Systems


Gastrointestinal: denies: nausea, vomiting, abdominal pain, diarrhea, 

constipation, melena, hematochezia, other


Genitourinary: denies: dysuria, frequency, incontinence, hematuria, retention, 

other


Musculoskeletal: denies: neck pain, shoulder pain, arm pain, back pain, hand 

pain, leg pain, foot pain, other





- Medication


Medications: 


Active Medications











Generic Name Dose Route Start Last Admin





  Trade Name Freq  PRN Reason Stop Dose Admin


 


Artificial Tears  2 drop  01/17/20 09:51  01/17/20 17:34





  Liquitears 15ml Bottle  EA EYE   2 drop





  QIDPRN PRN   Administration





  Dry Eyes   





     





     





     


 


Aspirin  300 mg  01/15/20 09:00  01/18/20 08:44





  Aspirin  DC   300 mg





  DAILY [facility]   Administration





     





     





     





     


 


Atorvastatin Calcium  10 mg  01/15/20 21:00  01/17/20 21:05





  Lipitor  PO   10 mg





  HS [facility]   Administration





     





     





     





     


 


Enoxaparin Sodium  120 mg  01/15/20 21:00  01/18/20 08:51





  Lovenox  SC   120 mg





  0900,2100 [facility]   Administration





     





     





     





     


 


Ceftriaxone Sodium 2 gm/  100 mls @ 200 mls/hr  01/15/20 14:00  01/17/20 15:03





  Sodium Chloride  IVPB   100 mls





  1400 [facility]   Administration





     





     





     





     


 


Miscellaneous Medication  0 ml  01/17/20 09:52  01/17/20 17:36





  Biotene Moisturizing Mouth  MM   1 spr





  PRN PRN   Administration





  Dry Mouth   





     





     





     


 


Pantoprazole Sodium  40 mg  01/17/20 09:00  01/18/20 08:50





  Protonix  PO   40 mg





  DAILY [facility]   Administration





     





     





     





     


 


Sodium Chloride  10 ml  01/14/20 15:50  01/18/20 08:51





  Flush - Normal Saline  IVF   10 ml





  PRN PRN   Administration





  Saline Flush   





     





     





     














- Exam


Neck: negative: supple, symmetric, no JVD, no thyromegaly, no lymphadenopathy, 

no carotid bruit, JVD


Heart: negative: RRR, no murmur, no gallops, no rubs, normal peripheral pulses, 

irregular, diminshed peripheral pulses, murmur present, II/IV, III/IV


Respiratory: negative: CTAB, no wheezes, no rales, no ronchi, normal chest 

expansion, no tachypnea, normal percussion, rales, rhonchi, tachypneic, wheezes


Gastrointestinal: negative: soft, non-tender, non-distended, normal bowel sounds

, no palpable masses, no hepatomegaly, no splenomegaly, no bruit, no guarding, 

no rigidity, tender to palpation, distended, diminished bowl sounds, voluntary 

guarding





Hosp A/P


(1) Acute ischemic right MCA stroke


Code(s): I63.511 - CEREB INFRC D/T UNSP OCCLS OR STENOS OF RIGHT MID CEREB ART 

  Status: Acute   





(2) Atrial fibrillation with RVR


Code(s): I48.91 - UNSPECIFIED ATRIAL FIBRILLATION   Status: Acute   





(3) Morbid obesity


Code(s): E66.01 - MORBID (SEVERE) OBESITY DUE TO EXCESS CALORIES   Status: 

Acute   





(4) UTI (urinary tract infection)


Status: Acute   





(5) Hypertension


Code(s): I10 - ESSENTIAL (PRIMARY) HYPERTENSION   Status: Chronic   


Qualifiers: 


   Hypertension type: essential hypertension   Qualified Code(s): I10 - 

Essential (primary) hypertension   





(6) UTI (urinary tract infection)


Status: Acute   





- Plan





rate controlled on cardizam. continue AC. pt going for swallow eval. will 

continue abx for her uti. possible will try to remove hough. 





1/18 switched Cardizem to oral, will discontinue hough. Awaiting approval for 

inpatient rehab.

## 2020-01-19 RX ADMIN — CEFTRIAXONE SCH MLS: 2 INJECTION, POWDER, FOR SOLUTION INTRAMUSCULAR; INTRAVENOUS at 14:51

## 2020-01-19 NOTE — PDOC.CPN
- Subjective


Date: 01/19/20


Time: 09:57


Interval history: 





The pt seen and examined.  No overnight events.  No cardiac complaints.  She 

had stood up 5 times with PT this AM; 





- Objective


Allergies/Adverse Reactions: 


 Allergies











Allergy/AdvReac Type Severity Reaction Status Date / Time


 


sulfamethoxazole Allergy   Verified 01/14/20 16:07





[From Bactrim]     


 


trimethoprim [From Bactrim] Allergy   Verified 01/14/20 16:07











Visit Medications: 


 Current Medications





Acetaminophen (Tylenol)  650 mg PO Q4H PRN


   PRN Reason: Headache/Fever/Mild Pain (1-3)


Acetaminophen (Tylenol)  650 mg HI Q4H PRN


   PRN Reason: Headache/Fever/Mild Pain (1-3)


Artificial Tears (Liquitears 15ml Bottle)  2 drop EA EYE QIDPRN PRN


   PRN Reason: Dry Eyes


   Last Admin: 01/17/20 17:34 Dose:  2 drop


Aspirin (Ecotrin)  81 mg PO DAILY [facility]


Atorvastatin Calcium (Lipitor)  10 mg PO HS [facility]


   Last Admin: 01/18/20 21:56 Dose:  10 mg


Carvedilol (Coreg)  3.125 mg PO BID-[facility]


Dexamethasone (Decadron)  2 mg PO BID-WM [facility]


Diltiazem HCl (Cardizem)  30 mg PO BID [facility]


   Last Admin: 01/19/20 08:17 Dose:  30 mg


Enoxaparin Sodium (Lovenox)  120 mg SC 0900,2100 [facility]


   Last Admin: 01/19/20 08:17 Dose:  120 mg


Guaifenesin/Dextromethorphan (Robitussin Dm)  15 ml PO Q4H PRN


   PRN Reason: Cough


Hydralazine HCl (Apresoline)  10 mg SLOW IVP Q4H PRN


   PRN Reason: BP > 220/110


Ceftriaxone Sodium 2 gm/ (Sodium Chloride)  100 mls @ 200 mls/hr IVPB 1400 [facility]


   Last Admin: 01/18/20 14:27 Dose:  100 mls


Labetalol HCl (Normodyne)  20 mg SLOW IVP Q1H PRN


   PRN Reason: BP > 220/110


Miscellaneous Medication (Biotene Moisturizing Mouth)  0 ml MM PRN PRN


   PRN Reason: Dry Mouth


   Last Admin: 01/17/20 17:36 Dose:  1 spr


Ondansetron HCl (Zofran Odt)  4 mg PO Q6H PRN


   PRN Reason: Nausea/Vomiting


Ondansetron HCl (Zofran)  4 mg IVP Q6H PRN


   PRN Reason: Nausea/Vomiting


Pantoprazole Sodium (Protonix)  40 mg PO DAILY [facility]


   Last Admin: 01/19/20 08:17 Dose:  40 mg


Senna/Docusate Sodium (Senokot S)  2 tab PO BID PRN


   PRN Reason: Constipation


Sodium Chloride (Flush - Normal Saline)  10 ml IVF PRN PRN


   PRN Reason: Saline Flush


   Last Admin: 01/18/20 08:51 Dose:  10 ml








Vital Signs & Weight: 


 Vital Signs











  Temp Pulse Resp BP Pulse Ox


 


 01/19/20 07:00  97.5 F L  94  16  144/96 H  96


 


 01/19/20 04:00  98.7 F  96  16  145/79 H  95








 











Admit Weight                   281 lb


 


Weight                         281 lb 7 oz

















- Physical Exam


General: alert & oriented x3


Cardiac: irregularly regular


Lungs: decreased breath sounds





- Labs


Result Diagrams: 


 01/15/20 04:32





 01/15/20 04:32


 Troponin/CKMB











Troponin I  Less than  0.010 ng/mL (< 0.028)   01/14/20  09:02    














- Telemetry


Supraventricular conduction: atrial fibrillation





- Assessment/Plan


Assessment/Plan: 





1. Afib with RVR - well controlled HR with Diltiazem 30mg BID; will start Coreg 

3.125mg BID from this AM; on Lovenox 120mg BID which will be changed to PO OAC 

at discharge


2. Acute ischemic right MCA stroke with Lt side weakness


3. HTN - stable


4. UTI 


5. Morbid obesity


MAR reviewed





* Echo on 01/15/2020 with EF 50-55%, mod dilated LA, mod-severe TR, and dilated 

IVC


* Miguelito Valenzuela' pt





Pt. seen and eval. by me. I agree with the A/P by the NP.Chest clear. Irreg/

irreg. rhythm.

## 2020-01-19 NOTE — CT
EXAM:

CT Brain WO Con



PROVIDED CLINICAL HISTORY:

Stroke



COMPARISON:

1/16/2020



FINDINGS:

Right MCA distribution infarction is redemonstrated. There is no evidence for intracranial hemorrhage
. Continued significant mass effect upon the right lateral ventricle with right-to-left shift of

the midline structures measuring about 9 mm. This is slightly increased with respect to the prior hao
dy. The basilar cisterns remain patent. There is no ventriculomegaly. The extracranial soft tissues

and osseous structures demonstrate a stable CT appearance.



IMPRESSION:

Redemonstration of large right MCA distribution infarction with mild increase in associated mass effe
ct.



Reported By: Steven Cano 

Electronically Signed:  1/19/2020 11:04 AM

## 2020-01-19 NOTE — PDOC.HOSPP
- Subjective


Encounter Date: 01/19/20


Encounter Time: 10:15


Subjective: 





pt up in bed no complains. 





- Objective


Vital Signs & Weight: 


 Vital Signs (12 hours)











  Temp Pulse Pulse Resp BP BP BP


 


 01/19/20 15:36  97.6 F  91   16    137/87


 


 01/19/20 12:00   120 H   18    142/91 H


 


 01/19/20 11:00  97.8 F  99   18    113/60


 


 01/19/20 09:33    94   113/66  148/86 H 


 


 01/19/20 07:00  97.5 F L  94   16    144/96 H














  Pulse Ox


 


 01/19/20 15:36  98


 


 01/19/20 12:00 


 


 01/19/20 11:00  96


 


 01/19/20 09:33 


 


 01/19/20 07:00  96








 Weight











Admit Weight                   281 lb


 


Weight                         281 lb 7 oz














I&O: 


 











 01/18/20 01/19/20 01/20/20





 06:59 06:59 06:59


 


Intake Total 500 625 


 


Output Total 750 800 


 


Balance -250 -175 











Result Diagrams: 


 01/15/20 04:32





 01/15/20 04:32





Hospitalist ROS





- Review of Systems


Cardiovascular: denies: chest pain, palpitations, orthopnea, paroxysmal noc. 

dyspnea, edema, light headedness, other


Gastrointestinal: denies: nausea, vomiting, abdominal pain, diarrhea, 

constipation, melena, hematochezia, other


Genitourinary: denies: dysuria, frequency, incontinence, hematuria, retention, 

other





- Medication


Medications: 


Active Medications











Generic Name Dose Route Start Last Admin





  Trade Name Freq  PRN Reason Stop Dose Admin


 


Artificial Tears  2 drop  01/17/20 09:51  01/17/20 17:34





  Liquitears 15ml Bottle  EA EYE   2 drop





  QIDPRN PRN   Administration





  Dry Eyes   





     





     





     


 


Atorvastatin Calcium  10 mg  01/15/20 21:00  01/18/20 21:56





  Lipitor  PO   10 mg





  HS [facility]   Administration





     





     





     





     


 


Bisacodyl  10 mg  01/19/20 10:37  01/19/20 11:54





  Dulcolax  CA   10 mg





  DAILYPRN PRN   Administration





  Constipation   





     





     





     


 


Diltiazem HCl  30 mg  01/18/20 21:00  01/19/20 08:17





  Cardizem  PO   30 mg





  BID [facility]   Administration





     





     





     





     


 


Diltiazem HCl  5 mg  01/19/20 14:30  01/19/20 14:53





  Cardizem  SLOW IVP  01/19/20 17:00  5 mg





  NOW [facility]   Administration





     





     





     





     


 


Ceftriaxone Sodium 2 gm/  100 mls @ 200 mls/hr  01/15/20 14:00  01/19/20 14:51





  Sodium Chloride  IVPB   100 mls





  1400 [facility]   Administration





     





     





     





     


 


Miscellaneous Medication  0 ml  01/17/20 09:52  01/17/20 17:36





  Biotene Moisturizing Mouth  MM   1 spr





  PRN PRN   Administration





  Dry Mouth   





     





     





     


 


Pantoprazole Sodium  40 mg  01/17/20 09:00  01/19/20 08:17





  Protonix  PO   40 mg





  DAILY [facility]   Administration





     





     





     





     


 


Sodium Chloride  10 ml  01/14/20 15:50  01/18/20 08:51





  Flush - Normal Saline  IVF   10 ml





  PRN PRN   Administration





  Saline Flush   





     





     





     














- Exam


Neck: negative: supple, symmetric, no JVD, no thyromegaly, no lymphadenopathy, 

no carotid bruit, JVD


Heart: negative: RRR, no murmur, no gallops, no rubs, normal peripheral pulses, 

irregular, diminshed peripheral pulses, murmur present, II/IV, III/IV


Respiratory: negative: CTAB, no wheezes, no rales, no ronchi, normal chest 

expansion, no tachypnea, normal percussion, rales, rhonchi, tachypneic, wheezes


Gastrointestinal: negative: soft, non-tender, non-distended, normal bowel sounds

, no palpable masses, no hepatomegaly, no splenomegaly, no bruit, no guarding, 

no rigidity, tender to palpation, distended, diminished bowl sounds, voluntary 

guarding


Extremities: negative: no cyanosis, no clubbing, no edema, 1+ LE edema, 2+ LE 

edema, clubbing





Hosp A/P


(1) Acute ischemic right MCA stroke


Code(s): I63.511 - CEREB INFRC D/T UNSP OCCLS OR STENOS OF RIGHT MID CEREB ART 

  Status: Acute   





(2) Atrial fibrillation with RVR


Code(s): I48.91 - UNSPECIFIED ATRIAL FIBRILLATION   Status: Acute   





(3) Morbid obesity


Code(s): E66.01 - MORBID (SEVERE) OBESITY DUE TO EXCESS CALORIES   Status: 

Acute   





(4) UTI (urinary tract infection)


Status: Acute   





(5) Hypertension


Code(s): I10 - ESSENTIAL (PRIMARY) HYPERTENSION   Status: Chronic   


Qualifiers: 


   Hypertension type: essential hypertension   Qualified Code(s): I10 - 

Essential (primary) hypertension   





(6) UTI (urinary tract infection)


Status: Acute   





- Plan





rate controlled on cardizam. continue AC. pt going for swallow eval. will 

continue abx for her uti. possible will try to remove hough. 





1/18 switched Cardizem to oral, will discontinue hough. Awaiting approval for 

inpatient rehab. 





1/19 pt tried to have a BM was in afib with rvr, was given cardizem iv x1. ct 

brain wanted to make sure no bleeding for switching lovonox to eliquis. No 

bleed. will switch to eliquis. ct did indicate increase in mass effect. will 

continue decadorn iv for one more day then switch to oral on discharge. I have 

recommended to try ambulate the pt to see if she gets tachycardia for med 

adjustment. family updated.

## 2020-01-20 VITALS — TEMPERATURE: 98.7 F

## 2020-01-20 VITALS — SYSTOLIC BLOOD PRESSURE: 139 MMHG | DIASTOLIC BLOOD PRESSURE: 93 MMHG

## 2020-01-20 LAB
CREAT CL PREDICTED SERPL C-G-VRATE: 141 ML/MIN (ref 70–130)
HGB BLD-MCNC: 14.9 G/DL (ref 12–16)
PLATELET # BLD AUTO: 250 THOU/UL (ref 130–400)

## 2020-01-20 RX ADMIN — CEFTRIAXONE SCH MLS: 2 INJECTION, POWDER, FOR SOLUTION INTRAMUSCULAR; INTRAVENOUS at 13:22

## 2020-01-20 RX ADMIN — Medication PRN ML: at 08:48

## 2020-01-20 NOTE — PDOC.HOSPP
- Subjective


Encounter Date: 01/20/20


Encounter Time: 09:30


Subjective: 


Patient with some tachycardia on monitor during PT, otherwise well controlled. 

No symptoms.





- Objective


Vital Signs & Weight: 


 Vital Signs (12 hours)











  Temp Pulse Resp BP Pulse Ox


 


 01/20/20 08:00  98.5 F  98  16  149/108 H  96


 


 01/20/20 04:00  97.8 F  80  16  159/85 H  96


 


 01/20/20 00:00  97.7 F  87  16  113/78  95








 Weight











Admit Weight                   281 lb


 


Weight                         281 lb 7 oz














I&O: 


 











 01/19/20 01/20/20 01/21/20





 06:59 06:59 06:59


 


Intake Total 625 470 


 


Output Total 800 850 


 


Balance -175 -380 











Result Diagrams: 


 01/20/20 04:02





 01/20/20 04:02





Hospitalist ROS





- Review of Systems


Constitutional: denies: fever, chills


Respiratory: denies: cough, dry, shortness of breath


Cardiovascular: denies: chest pain, palpitations


Gastrointestinal: denies: nausea, vomiting, abdominal pain


Genitourinary: denies: dysuria, frequency





- Medication


Medications: 


Active Medications











Generic Name Dose Route Start Last Admin





  Trade Name Freq  PRN Reason Stop Dose Admin


 


Apixaban  5 mg  01/19/20 21:00  01/20/20 08:46





  Eliquis  PO   5 mg





  BID [facility]   Administration





     





     





     





     


 


Artificial Tears  2 drop  01/17/20 09:51  01/17/20 17:34





  Liquitears 15ml Bottle  EA EYE   2 drop





  QIDPRN PRN   Administration





  Dry Eyes   





     





     





     


 


Aspirin  81 mg  01/20/20 09:00  01/20/20 08:46





  Ecotrin  PO   81 mg





  DAILY [facility]   Administration





     





     





     





     


 


Atorvastatin Calcium  10 mg  01/15/20 21:00  01/19/20 20:50





  Lipitor  PO   10 mg





  HS [facility]   Administration





     





     





     





     


 


Bisacodyl  10 mg  01/19/20 10:37  01/19/20 11:54





  Dulcolax  OR   10 mg





  DAILYPRN PRN   Administration





  Constipation   





     





     





     


 


Carvedilol  3.125 mg  01/19/20 17:00  01/20/20 08:47





  Coreg  PO   3.125 mg





  BID-WM [facility]   Administration





     





     





     





     


 


Dexamethasone  4 mg  01/19/20 21:00  01/20/20 08:48





  Decadron  SLOW IVP   4 mg





  BID [facility]   Administration





     





     





     





     


 


Diltiazem HCl  30 mg  01/20/20 09:00  01/20/20 08:46





  Cardizem  PO   30 mg





  TID [facility]   Administration





     





     





     





     


 


Ceftriaxone Sodium 2 gm/  100 mls @ 200 mls/hr  01/15/20 14:00  01/19/20 14:51





  Sodium Chloride  IVPB   100 mls





  1400 [facility]   Administration





     





     





     





     


 


Miscellaneous Medication  0 ml  01/17/20 09:52  01/17/20 17:36





  Biotene Moisturizing Mouth  MM   1 spr





  PRN PRN   Administration





  Dry Mouth   





     





     





     


 


Pantoprazole Sodium  40 mg  01/17/20 09:00  01/20/20 08:47





  Protonix  PO   40 mg





  DAILY [facility]   Administration





     





     





     





     


 


Sodium Chloride  10 ml  01/14/20 15:50  01/20/20 08:48





  Flush - Normal Saline  IVF   10 ml





  PRN PRN   Administration





  Saline Flush   





     





     





     














- Exam


General Appearance: NAD, awake alert


ENT: moist mucosa


Heart: no murmur, no gallops, no rubs, irregular


Respiratory: CTAB, no wheezes, no rales, no ronchi


Gastrointestinal: soft, non-tender, non-distended, normal bowel sounds


Neurological - other findings: left side weakness


Psychiatric: normal affect, normal behavior





Hosp A/P


(1) Acute ischemic right MCA stroke


Code(s): I63.511 - CEREB INFRC D/T UNSP OCCLS OR STENOS OF RIGHT MID CEREB ART 

  Status: Acute   





(2) Atrial fibrillation with RVR


Code(s): I48.91 - UNSPECIFIED ATRIAL FIBRILLATION   Status: Acute   





(3) Cerebral edema


Code(s): G93.6 - CEREBRAL EDEMA   Status: Acute   





(4) UTI (urinary tract infection)


Status: Acute   


Qualifiers: 


   Urinary tract infection type: acute cystitis 


Plan: 


proteus








(5) Hypertension


Code(s): I10 - ESSENTIAL (PRIMARY) HYPERTENSION   Status: Chronic   


Qualifiers: 


   Hypertension type: essential hypertension   Qualified Code(s): I10 - 

Essential (primary) hypertension   





(6) Morbid obesity


Code(s): E66.01 - MORBID (SEVERE) OBESITY DUE TO EXCESS CALORIES   Status: 

Chronic   





- Plan


plan discussed w/ family


Clinically stable. Transitioned to oral anticoagulants. No proven role for 

steroids in edema from ischemic stroke. Will d/c decadron. Only 5 days, so no 

need to taper. Will d/c to rehab today. Spoke with Dr. Valenzuela about the 

spikes of HR to 170 with PT that immediately came back down and were 

asymptomatic. He has already increased Coreg and Cardizem today, this can be 

very slowly titrated up at rehab. Ok to transfer to rehab from cardiac 

standpoint per Dr. Valenzuela.

## 2020-01-21 NOTE — DIS
DATE OF ADMISSION:  01/14/2020



DATE OF DISCHARGE:  01/20/2020



PRIMARY CARE PHYSICIANS:  Dr. Alfred Dominguez at [place] and [place] when in [facility].  While

in [place], she sees a PA named Ira. 



REASON FOR ADMISSION:  Severe acute ischemic stroke.



DIAGNOSES AT DISCHARGE:  

1. Acute right middle cerebral artery ischemic stroke.

2. Atrial fibrillation with rapid ventricular rate.

3. Cerebral edema.

4. Urinary tract infection with Proteus.

5. Hypertension.

6. Morbid obesity.



PROCEDURES:  

1. CT of the brain without contrast showing evidence for acute infarction involving

the majority of the MCA territory on the right without associated hemorrhage and

mild midline shift from right to left. 

2. Repeat CT scan multiple times showing re-demonstration of large right MCA

distribution infarction and some increased edema and mass effect with left-to-right

shift of 9 mm. 

3. Modified barium swallow showing some minimal penetration of thin liquid

consistencies when using a straw. 

4. Echocardiogram showing an ejection fraction of 50% to 55%, also with some

moderate-to-severe tricuspid regurgitation and a dilated IVC. 



CONSULTATIONS:  

1. Neurology, Dr. Allen.

2. Cardiology, Dr. Valenzuela.

3. Palliative Care.



SUMMARY OF HOSPITAL COURSE:  This is a 64-year-old white female with a past medical

history of hypertension, hyperlipidemia, and mitral valve prolapse, who had some

recent atrial fibrillation in the last 1 to 2 months.  She was found down in her

hotel room after knowing seeing her overnight.  She was brought to the emergency

room, had a majority of the MCA distribution on the right, showing evidence of

stroke in the ER.  The size of this infarct made her not a candidate for a clot

retrieval therapy due to bleeding risk.  She was treated with aspirin and admitted

to the hospital.  The patient had some significant weakness on her left side.  She

did have Physical Therapy and Speech Therapy work with her.  She did start to move a

little better with physical therapy and Speech Therapy determined safe diet for her

of nectar thick liquids and ground textures with extra sauce and gravy.  The patient

did have atrial fibrillation with rapid ventricular rate on admission.  Dr. Valenzuela

was consulted.  This was eventually controlled once and she was switched to oral

medications.  The patient was put on both diltiazem and carvedilol.  She did have

some problems with digoxin causing severe bradycardia, so her diltiazem and her

carvedilol were slowly increased.  She was still having some tachycardia with

physical exertion only, but no symptoms from this and so she was cleared to go to

rehab by Dr. Valenzuela from a Cardiology standpoint.  She was accepted to inpatient

rehabilitation at [facility].  The patient did have a Proteus UTI diagnosed

during her hospitalization.  This was not thought to be symptomatic.  She was

treated with IV Rocephin and this is being switched to Omnicef to complete a 10-day

course of antibiotics. 



DISCHARGE MANAGEMENT:  Discharged to [facility].



ACTIVITY:  As tolerated.



DIET:  Healthy heart diet with Speech Therapy recommendations as above.



THERAPY:  Occupational, physical, and speech therapy.



FOLLOWUP:  The patient is to follow up with Cardiology as an outpatient.



DISCHARGE MEDICATIONS:  

1. Eliquis 5 mg twice a day, 60 tablets dispensed.

2. Omnicef 300 mg twice a day, 4 caps dispensed.

3. Aspirin 81 mg daily.

4. Atorvastatin 10 mg at night.

5. Dulcolax 10 mg daily as needed.

6. Cardizem 30 mg 3 times a day.

7. Carvedilol 3.125 mg twice a day.

8. Protonix 40 mg daily.



TIME SPENT:  Arranging the details of this discharge took 35 minutes.







Job ID:  [id number]